# Patient Record
Sex: MALE | Race: WHITE | NOT HISPANIC OR LATINO | Employment: FULL TIME | URBAN - METROPOLITAN AREA
[De-identification: names, ages, dates, MRNs, and addresses within clinical notes are randomized per-mention and may not be internally consistent; named-entity substitution may affect disease eponyms.]

---

## 2021-03-13 ENCOUNTER — OFFICE VISIT (OUTPATIENT)
Dept: URGENT CARE | Facility: CLINIC | Age: 36
End: 2021-03-13
Payer: MEDICAID

## 2021-03-13 VITALS
RESPIRATION RATE: 18 BRPM | DIASTOLIC BLOOD PRESSURE: 78 MMHG | OXYGEN SATURATION: 98 % | HEIGHT: 70 IN | SYSTOLIC BLOOD PRESSURE: 117 MMHG | BODY MASS INDEX: 18.61 KG/M2 | HEART RATE: 88 BPM | TEMPERATURE: 98 F | WEIGHT: 130 LBS

## 2021-03-13 DIAGNOSIS — S42.032A CLOSED DISPLACED FRACTURE OF ACROMIAL END OF LEFT CLAVICLE, INITIAL ENCOUNTER: Primary | ICD-10-CM

## 2021-03-13 PROCEDURE — 99203 PR OFFICE/OUTPT VISIT, NEW, LEVL III, 30-44 MIN: ICD-10-PCS | Mod: TIER,S$GLB,, | Performed by: PHYSICIAN ASSISTANT

## 2021-03-13 PROCEDURE — 99203 OFFICE O/P NEW LOW 30 MIN: CPT | Mod: TIER,S$GLB,, | Performed by: PHYSICIAN ASSISTANT

## 2021-03-13 PROCEDURE — 73030 X-RAY EXAM OF SHOULDER: CPT | Mod: FY,TIER,LT,S$GLB | Performed by: RADIOLOGY

## 2021-03-13 PROCEDURE — 73030 XR SHOULDER TRAUMA 3 VIEW LEFT: ICD-10-PCS | Mod: FY,TIER,LT,S$GLB | Performed by: RADIOLOGY

## 2021-03-13 RX ORDER — HYDROCODONE BITARTRATE AND ACETAMINOPHEN 5; 325 MG/1; MG/1
1 TABLET ORAL EVERY 6 HOURS PRN
Qty: 10 TABLET | Refills: 0 | Status: SHIPPED | OUTPATIENT
Start: 2021-03-13

## 2021-03-16 ENCOUNTER — OFFICE VISIT (OUTPATIENT)
Dept: ORTHOPEDICS | Facility: CLINIC | Age: 36
End: 2021-03-16
Payer: MEDICAID

## 2021-03-16 VITALS
SYSTOLIC BLOOD PRESSURE: 108 MMHG | BODY MASS INDEX: 19.71 KG/M2 | WEIGHT: 137.69 LBS | HEART RATE: 69 BPM | HEIGHT: 70 IN | DIASTOLIC BLOOD PRESSURE: 63 MMHG

## 2021-03-16 DIAGNOSIS — S42.032A CLOSED DISPLACED FRACTURE OF ACROMIAL END OF LEFT CLAVICLE, INITIAL ENCOUNTER: Primary | ICD-10-CM

## 2021-03-16 PROCEDURE — 99202 PR OFFICE/OUTPT VISIT, NEW, LEVL II, 15-29 MIN: ICD-10-PCS | Mod: S$PBB,,, | Performed by: PHYSICIAN ASSISTANT

## 2021-03-16 PROCEDURE — 99999 PR PBB SHADOW E&M-EST. PATIENT-LVL III: CPT | Mod: PBBFAC,,, | Performed by: PHYSICIAN ASSISTANT

## 2021-03-16 PROCEDURE — 99202 OFFICE O/P NEW SF 15 MIN: CPT | Mod: S$PBB,,, | Performed by: PHYSICIAN ASSISTANT

## 2021-03-16 PROCEDURE — 99999 PR PBB SHADOW E&M-EST. PATIENT-LVL III: ICD-10-PCS | Mod: PBBFAC,,, | Performed by: PHYSICIAN ASSISTANT

## 2021-03-16 PROCEDURE — 99213 OFFICE O/P EST LOW 20 MIN: CPT | Mod: PBBFAC | Performed by: PHYSICIAN ASSISTANT

## 2021-03-16 RX ORDER — TRAMADOL HYDROCHLORIDE 50 MG/1
50 TABLET ORAL
Qty: 10 TABLET | Refills: 0 | Status: SHIPPED | OUTPATIENT
Start: 2021-03-16

## 2021-03-16 RX ORDER — IBUPROFEN 800 MG/1
800 TABLET ORAL
Qty: 90 TABLET | Refills: 1 | Status: SHIPPED | OUTPATIENT
Start: 2021-03-16 | End: 2021-03-26

## 2021-05-05 ENCOUNTER — TELEPHONE (OUTPATIENT)
Dept: URGENT CARE | Facility: CLINIC | Age: 36
End: 2021-05-05

## 2021-05-05 DIAGNOSIS — S42.032A CLOSED DISPLACED FRACTURE OF ACROMIAL END OF LEFT CLAVICLE, INITIAL ENCOUNTER: Primary | ICD-10-CM

## 2024-06-17 ENCOUNTER — OFFICE VISIT (OUTPATIENT)
Dept: URGENT CARE | Facility: CLINIC | Age: 39
End: 2024-06-17
Payer: MEDICAID

## 2024-06-17 VITALS
RESPIRATION RATE: 18 BRPM | TEMPERATURE: 97 F | WEIGHT: 137 LBS | SYSTOLIC BLOOD PRESSURE: 120 MMHG | HEIGHT: 70 IN | HEART RATE: 88 BPM | DIASTOLIC BLOOD PRESSURE: 76 MMHG | BODY MASS INDEX: 19.61 KG/M2 | OXYGEN SATURATION: 99 %

## 2024-06-17 DIAGNOSIS — T63.441A ALLERGIC REACTION TO BEE STING: Primary | ICD-10-CM

## 2024-06-17 DIAGNOSIS — T63.444A BEE STING, UNDETERMINED INTENT, INITIAL ENCOUNTER: ICD-10-CM

## 2024-06-17 PROCEDURE — 99203 OFFICE O/P NEW LOW 30 MIN: CPT | Mod: S$GLB,,, | Performed by: PHYSICIAN ASSISTANT

## 2024-06-17 RX ORDER — DEXAMETHASONE SODIUM PHOSPHATE 100 MG/10ML
8 INJECTION INTRAMUSCULAR; INTRAVENOUS
Status: COMPLETED | OUTPATIENT
Start: 2024-06-17 | End: 2024-06-17

## 2024-06-17 RX ADMIN — DEXAMETHASONE SODIUM PHOSPHATE 8 MG: 100 INJECTION INTRAMUSCULAR; INTRAVENOUS at 03:06

## 2024-06-17 NOTE — PROGRESS NOTES
"Subjective:      Patient ID: Jewel Horvath is a 39 y.o. male.    Vitals:  height is 5' 10" (1.778 m) and weight is 62.1 kg (137 lb). His oral temperature is 97 °F (36.1 °C). His blood pressure is 120/76 and his pulse is 88. His respiration is 18 and oxygen saturation is 99%.     Chief Complaint: Insect Bite    Pt is a 39-year-old male who presents today for evaluation bee sting to left eyebrow that occurred yesterday.  Patient states that it immediately became swollen around the left eye and eyebrow.  Patient took Benadryl and ibuprofen and put cool compresses on in it improved somewhat before he went to bed but it was still swollen, when he woke up this morning the swelling was back to how it was before he took the Benadryl and ibuprofen.  Throughout the day today it has remained swollen.  It is not painful, if he presses immediately over the sting on his eyebrow it is mildly tender.  The rest of the area around the eye is puffy but does not hurt.  No change in vision.  No difficulty with eye movements.  No fever or other new associated symptoms.  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  No hives, throat closing sensation, wheezing, or difficulty swallowing or breathing.    Insect Bite  This is a new problem. The current episode started yesterday. The problem occurs constantly. The problem has been rapidly improving. Pertinent negatives include no abdominal pain, arthralgias, chest pain, chills, congestion, coughing, diaphoresis, fatigue, fever, headaches, nausea, neck pain, numbness, rash, sore throat or vomiting. Treatments tried: benadryl. The treatment provided mild relief.       Constitution: Negative for chills, sweating, fatigue and fever.   HENT:  Negative for ear pain, congestion and sore throat.    Neck: Negative for neck pain and neck stiffness.   Cardiovascular:  Negative for chest pain, leg swelling, palpitations and sob on exertion.   Eyes:  Positive for eyelid swelling. Negative for eye " itching, eye pain and eye redness.   Respiratory:  Negative for cough, sputum production and shortness of breath.    Gastrointestinal:  Negative for abdominal pain, nausea, vomiting and diarrhea.   Genitourinary:  Negative for dysuria, frequency, urgency, flank pain and hematuria.   Musculoskeletal:  Negative for joint pain.   Skin:  Positive for erythema. Negative for color change and rash.   Neurological:  Negative for dizziness, passing out, headaches, disorientation and numbness.   Psychiatric/Behavioral:  Negative for disorientation.       Objective:     Physical Exam   Constitutional: He is oriented to person, place, and time. He appears well-developed.  Non-toxic appearance. He does not appear ill. No distress.   HENT:   Head: Normocephalic and atraumatic.       Ears:   Right Ear: External ear normal.   Left Ear: External ear normal.   Eyes: Conjunctivae are normal. Right eye exhibits no discharge. Left eye exhibits no discharge. No scleral icterus.   Pulmonary/Chest: Effort normal. No accessory muscle usage or stridor. No tachypnea and no bradypnea. No respiratory distress. He has no wheezes.   Neurological: He is alert and oriented to person, place, and time.   Skin: Skin is not diaphoretic. erythema   Psychiatric: His behavior is normal. Judgment and thought content normal.   Nursing note and vitals reviewed.      Assessment:     1. Allergic reaction to bee sting    2. Bee sting, undetermined intent, initial encounter        Plan:       Allergic reaction to bee sting  -     dexAMETHasone injection 8 mg    Bee sting, undetermined intent, initial encounter    At this time this appears consistent with a local allergic reaction to bee sting rather than periorbital cellulitis given exam, timeline, and history.  Discussed symptoms of infection or complication and to seek medical attention if these occur or for any new or worsening symptoms.  Patient requesting steroid injection rather than oral prednisone as  alternative for allergic reaction on face which we will avoid topical steroids for.  Continue antihistamines, cool compresses. - Discussed dx, home care, tx options, and given follow up precautions.  I have reviewed the patient's chart to view previous visits, labs, and imaging to assess PMH and look for any trends or previous treatments.

## 2024-06-17 NOTE — PATIENT INSTRUCTIONS
- Rest.    - Drink plenty of fluids.    - Acetaminophen (tylenol) or Ibuprofen (advil,motrin) as directed as needed for fever/pain. Avoid tylenol if you have a history of liver disease. Do not take ibuprofen if you have a history of GI bleeding, kidney disease, or if you take blood thinners.     - Take over-the-counter claritin, zyrtec, allegra, or xyzal as directed for itching/rash/allergic reaction.    - you can take Benadryl over-the-counter as directed as well for itching/rash/allergic reaction.    - You received a steroid shot today.  This can elevate your blood pressure, elevate your blood sugar, water weight gain, nervous energy, redness to the face and dimpling of the skin where the shot goes in.   - Do not use steroids more than 3 times per year.   - If you have diabetes, please check you blood sugar frequently.  - If you have high blood pressure, please check your blood pressure frequently.     - cool compresses to affected area 10-15 minutes at a time at least 3 times daily.    - watch for signs of infection including increased redness, swelling, discharge, increased pain, red streaking, fever.  Seek medical attention immediately if these occur.    - Follow up with your PCP or specialty clinic as directed in the next 1-2 weeks if not improved or as needed.  You can call [phone] to schedule an appointment with the appropriate provider.    - Go to the ER or seek medical attention immediately if you develop new or worsening symptoms.     - You must understand that you have received an Urgent Care treatment only and that you may be released before all of your medical problems are known or treated.   - You, the patient, will arrange for follow up care as instructed.   - If your condition worsens or fails to improve we recommend that you receive another evaluation at the ER immediately or contact your PCP to discuss your concerns or return here.